# Patient Record
Sex: FEMALE | Race: WHITE | Employment: FULL TIME | URBAN - METROPOLITAN AREA
[De-identification: names, ages, dates, MRNs, and addresses within clinical notes are randomized per-mention and may not be internally consistent; named-entity substitution may affect disease eponyms.]

---

## 2019-11-13 ENCOUNTER — OP HISTORICAL/CONVERTED ENCOUNTER (OUTPATIENT)
Dept: OTHER | Age: 19
End: 2019-11-13

## 2019-11-13 LAB — CREATININE, EXTERNAL: 0.76

## 2019-11-20 ENCOUNTER — OP HISTORICAL/CONVERTED ENCOUNTER (OUTPATIENT)
Dept: OTHER | Age: 19
End: 2019-11-20

## 2020-07-22 VITALS
WEIGHT: 293 LBS | SYSTOLIC BLOOD PRESSURE: 126 MMHG | DIASTOLIC BLOOD PRESSURE: 74 MMHG | BODY MASS INDEX: 44.41 KG/M2 | HEIGHT: 68 IN

## 2020-07-22 DIAGNOSIS — H53.9 DISORDER OF VISION: ICD-10-CM

## 2020-07-22 PROBLEM — E66.01 MORBID OBESITY (HCC): Status: ACTIVE | Noted: 2020-07-22

## 2020-07-22 PROBLEM — R87.612 LOW GRADE SQUAMOUS INTRAEPITHELIAL LESION (LGSIL) ON PAPANICOLAOU SMEAR OF CERVIX: Status: ACTIVE | Noted: 2020-07-22

## 2020-07-22 PROBLEM — N92.6 IRREGULAR PERIODS: Status: ACTIVE | Noted: 2020-07-22

## 2020-07-22 PROBLEM — R10.32 LEFT LOWER QUADRANT PAIN: Status: ACTIVE | Noted: 2020-07-22

## 2020-07-22 PROBLEM — N83.209 CYST OF OVARY: Status: ACTIVE | Noted: 2020-07-22

## 2020-07-22 RX ORDER — LOPERAMIDE HYDROCHLORIDE 2 MG/1
CAPSULE ORAL
COMMUNITY
End: 2022-06-27

## 2020-07-22 RX ORDER — DICLOFENAC EPOLAMINE 0.01 G/1
1 PATCH TOPICAL EVERY 12 HOURS
COMMUNITY
End: 2022-06-27

## 2020-07-22 RX ORDER — NORETHINDRONE ACETATE AND ETHINYL ESTRADIOL .03; 1.5 MG/1; MG/1
TABLET ORAL
COMMUNITY
End: 2020-12-28

## 2020-07-22 RX ORDER — ONDANSETRON 4 MG/1
4 TABLET, ORALLY DISINTEGRATING ORAL
COMMUNITY
End: 2022-06-27

## 2021-06-07 VITALS — BODY MASS INDEX: 46.17 KG/M2 | HEIGHT: 68 IN

## 2021-06-11 ENCOUNTER — OFFICE VISIT (OUTPATIENT)
Dept: OBGYN CLINIC | Age: 21
End: 2021-06-11
Payer: COMMERCIAL

## 2021-06-11 VITALS
WEIGHT: 293 LBS | SYSTOLIC BLOOD PRESSURE: 128 MMHG | HEIGHT: 68 IN | BODY MASS INDEX: 44.41 KG/M2 | DIASTOLIC BLOOD PRESSURE: 77 MMHG

## 2021-06-11 DIAGNOSIS — Z01.419 ROUTINE GYNECOLOGICAL EXAMINATION: ICD-10-CM

## 2021-06-11 DIAGNOSIS — Z12.4 SCREENING FOR MALIGNANT NEOPLASM OF CERVIX: Primary | ICD-10-CM

## 2021-06-11 DIAGNOSIS — N94.89 SUPPRESSION OF MENSTRUATION: ICD-10-CM

## 2021-06-11 DIAGNOSIS — F32.81 PMDD (PREMENSTRUAL DYSPHORIC DISORDER): ICD-10-CM

## 2021-06-11 PROCEDURE — 99395 PREV VISIT EST AGE 18-39: CPT | Performed by: OBSTETRICS & GYNECOLOGY

## 2021-06-11 RX ORDER — NORETHINDRONE ACETATE AND ETHINYL ESTRADIOL .03; 1.5 MG/1; MG/1
1 TABLET ORAL DAILY
Qty: 3 PACKAGE | Refills: 3 | Status: SHIPPED | OUTPATIENT
Start: 2021-06-11 | End: 2022-06-27 | Stop reason: SDUPTHER

## 2021-06-11 RX ORDER — SERTRALINE HYDROCHLORIDE 25 MG/1
25 TABLET, FILM COATED ORAL DAILY
Qty: 90 TABLET | Refills: 0 | Status: SHIPPED | OUTPATIENT
Start: 2021-06-11 | End: 2022-06-27

## 2021-06-11 NOTE — PROGRESS NOTES
Chief Complaint   Patient presents with    Annual Exam     Visit Vitals  /77 (BP 1 Location: Right arm, BP Patient Position: Sitting, BP Cuff Size: Large adult)   Ht 5' 7.5\" (1.715 m)   Wt 298 lb 4 oz (135.3 kg)   LMP 06/07/2021 (Exact Date)   BMI 46.02 kg/m²

## 2021-06-11 NOTE — PROGRESS NOTES
Al Mendoza is a [de-identified] P5, 23 y.o. female   Patient's last menstrual period was 06/07/2021 (exact date). She presents for her annual    She is having no significant problems. Notes crying spells - anytime      Menstrual status:  Cycles are usually regular with a 26-32 day interval with 3-7 day duration. Flow: moderate. She does not have dysmenorrhea. Medical conditions:  Since her last annual GYN exam about one year ago, she has not the following changes in her health history: none. Mammogram History:    GUILLERMO Results (most recent):  No results found for this or any previous visit. DEXA Results (most recent):  No results found for this or any previous visit. Past Medical History:   Diagnosis Date    Morbid obesity ([address]      Past Surgical History:   Procedure Laterality Date    HX CYST REMOVAL      laparoscopic of left ovary    HX GYN      colposcopy    HX PELVIC LAPAROSCOPY         Prior to Admission medications    Medication Sig Start Date End Date Taking? Authorizing Provider   norethindrone ac-eth estradioL (Microgestin 1.5/30, 21,) 1.5-30 mg-mcg tab Take 1 Tablet by mouth daily. 6/11/21  Yes Anna Montalvo MD   sertraline (ZOLOFT) 25 mg tablet Take 1 Tablet by mouth daily. 6/11/21  Yes Anna Montalvo MD   diclofenac (FLECTOR) 1.3 % pt12 1 Patch by TransDERmal route every twelve (12) hours every twelve (12) hours. Provider, Historical   loperamide (IMODIUM) 2 mg capsule Take  by mouth. Provider, Historical   ondansetron (ZOFRAN ODT) 4 mg disintegrating tablet Take 4 mg by mouth every eight (8) hours as needed for Nausea or Vomiting. Provider, Historical       Allergies   Allergen Reactions    Latex Rash          Tobacco History:  reports that she has quit smoking. She smoked 0.25 packs per day. She has never used smokeless tobacco.  Alcohol Abuse:  reports previous alcohol use. Drug Abuse:  reports no history of drug use.     Family Medical/Cancer History: Family History   Problem Relation Age of Onset    Diabetes Paternal Grandmother           Review of Systems   Constitutional: Negative for chills, fever, malaise/fatigue and weight loss. HENT: Negative for congestion, ear pain, sinus pain and tinnitus. Eyes: Negative for blurred vision and double vision. Respiratory: Negative for cough, shortness of breath and wheezing. Cardiovascular: Negative for chest pain and palpitations. Gastrointestinal: Negative for abdominal pain, blood in stool, constipation, diarrhea, heartburn, nausea and vomiting. Genitourinary: Negative for dysuria, flank pain, frequency, hematuria and urgency. Musculoskeletal: Negative for joint pain and myalgias. Skin: Negative for itching and rash. Neurological: Negative for dizziness, weakness and headaches. Psychiatric/Behavioral: Negative for depression, memory loss and suicidal ideas. The patient is not nervous/anxious and does not have insomnia. Physical Exam  Constitutional:       Appearance: Normal appearance. HENT:      Head: Normocephalic and atraumatic. Cardiovascular:      Rate and Rhythm: Normal rate. Heart sounds: Normal heart sounds. Pulmonary:      Effort: Pulmonary effort is normal.      Breath sounds: Normal breath sounds. Chest:      Breasts:         Right: Normal.         Left: Normal.   Abdominal:      General: Abdomen is flat. Palpations: Abdomen is soft. Genitourinary:     General: Normal vulva. Vagina: Normal.      Cervix: Normal.      Uterus: Normal.       Adnexa: Right adnexa normal and left adnexa normal.      Rectum: Normal.      Comments: PAP Obtained  Neurological:      Mental Status: She is alert. Psychiatric:         Mood and Affect: Mood normal.         Behavior: Behavior normal.         Thought Content:  Thought content normal.          Visit Vitals  /77 (BP 1 Location: Right arm, BP Patient Position: Sitting, BP Cuff Size: Large adult)   Ht 5' 7.5\" (1.715 m)   Wt 298 lb 4 oz (135.3 kg)   LMP 06/07/2021 (Exact Date)   BMI 46.02 kg/m²         Assessment:   Diagnoses and all orders for this visit:    1. Screening for malignant neoplasm of cervix  -     PAP IG, RFX APTIMA HPV ASCUS (243108)    2. Routine gynecological examination  -     PAP IG, RFX APTIMA HPV ASCUS ([id number])    3. BMI 45.0-49.9, adult ([facility]    4. Suppression of menstruation    5. PMDD (premenstrual dysphoric disorder)    Other orders  -     norethindrone ac-eth estradioL (Microgestin 1.5/30, 21,) 1.5-30 mg-mcg tab; Take 1 Tablet by mouth daily. -     sertraline (ZOLOFT) 25 mg tablet; Take 1 Tablet by mouth daily. Plan: Questions addressed, will try low dose of Zoloft- pt agrees  Counseled re: diet, exercise, healthy lifestyle  Return for Annual        Follow-up and Dispositions    · Return in about 3 months (around 9/11/2021) for Follow-up.

## 2021-06-15 LAB
CYTOLOGIST CVX/VAG CYTO: NORMAL
CYTOLOGY CVX/VAG DOC CYTO: NORMAL
CYTOLOGY CVX/VAG DOC THIN PREP: NORMAL
DX ICD CODE: NORMAL
LABCORP, 190119: NORMAL
Lab: NORMAL
Lab: NORMAL
OTHER STN SPEC: NORMAL
STAT OF ADQ CVX/VAG CYTO-IMP: NORMAL

## 2021-10-27 ENCOUNTER — TELEPHONE (OUTPATIENT)
Dept: OBGYN CLINIC | Age: 21
End: 2021-10-27

## 2021-10-27 ENCOUNTER — OFFICE VISIT (OUTPATIENT)
Dept: OBGYN CLINIC | Age: 21
End: 2021-10-27
Payer: COMMERCIAL

## 2021-10-27 VITALS
DIASTOLIC BLOOD PRESSURE: 82 MMHG | HEIGHT: 68 IN | WEIGHT: 293 LBS | BODY MASS INDEX: 44.41 KG/M2 | SYSTOLIC BLOOD PRESSURE: 130 MMHG

## 2021-10-27 DIAGNOSIS — F32.81 PREMENSTRUAL DYSPHORIC DISORDER: ICD-10-CM

## 2021-10-27 DIAGNOSIS — F41.9 ANXIETY: Primary | ICD-10-CM

## 2021-10-27 PROCEDURE — 99213 OFFICE O/P EST LOW 20 MIN: CPT | Performed by: OBSTETRICS & GYNECOLOGY

## 2021-10-27 RX ORDER — SERTRALINE HYDROCHLORIDE 50 MG/1
50 TABLET, FILM COATED ORAL DAILY
Qty: 90 TABLET | Refills: 2 | Status: SHIPPED | OUTPATIENT
Start: 2021-10-27 | End: 2022-06-27

## 2021-10-27 NOTE — TELEPHONE ENCOUNTER
Returned the patient's call and she is stating Diane is telling her she doesn't have refills available on her OCPs. Advised her to have [facility] look in their files for the prescription submitted on 06/11/21.

## 2021-10-27 NOTE — PROGRESS NOTES
Lashon Wolff is a [de-identified] , 19 y.o. female   Patient's last menstrual period was 10/18/2021. She presents for her problem    She is having was on Zoloft for anxiety and depressive sxs- was much better on the medicine( lost her insurance and ran out of the medicine), has a new job with benefits now. .      Menstrual status:  Cycles are often irregular with no apparent pattern. Flow: moderate. She does not have dysmenorrhea. Medical conditions:  Since her last annual GYN exam about one year ago, she has not the following changes in her health history: none. Mammogram History:    GUILLERMO Results (most recent):  No results found for this or any previous visit. DEXA Results (most recent):  No results found for this or any previous visit. Past Medical History:   Diagnosis Date    Morbid obesity ([address]      Past Surgical History:   Procedure Laterality Date    HX CYST REMOVAL      laparoscopic of left ovary    HX GYN      colposcopy    HX PELVIC LAPAROSCOPY         Prior to Admission medications    Medication Sig Start Date End Date Taking? Authorizing Provider   sertraline (ZOLOFT) 50 mg tablet Take 1 Tablet by mouth daily. 10/27/21  Yes Jennifer Mckay MD   norethindrone ac-eth estradioL (Microgestin 1.5/30, 21,) 1.5-30 mg-mcg tab Take 1 Tablet by mouth daily. 6/11/21   Jennifer Mckay MD   sertraline (ZOLOFT) 25 mg tablet Take 1 Tablet by mouth daily. 6/11/21   Jennifer Mckay MD   diclofenac (FLECTOR) 1.3 % pt12 1 Patch by TransDERmal route every twelve (12) hours every twelve (12) hours. Provider, Historical   loperamide (IMODIUM) 2 mg capsule Take  by mouth. Provider, Historical   ondansetron (ZOFRAN ODT) 4 mg disintegrating tablet Take 4 mg by mouth every eight (8) hours as needed for Nausea or Vomiting. Provider, Historical       Allergies   Allergen Reactions    Latex Rash          Tobacco History:  reports that she has quit smoking. She smoked 0.25 packs per day.  She has never used smokeless tobacco.  Alcohol Abuse:  reports previous alcohol use. Drug Abuse:  reports no history of drug use. Family Medical/Cancer History:   Family History   Problem Relation Age of Onset    Diabetes Paternal Grandmother           Review of Systems   Constitutional: Negative for chills, fever, malaise/fatigue and weight loss. HENT: Negative for congestion, ear pain, sinus pain and tinnitus. Eyes: Negative for blurred vision and double vision. Respiratory: Negative for cough, shortness of breath and wheezing. Cardiovascular: Negative for chest pain and palpitations. Gastrointestinal: Negative for abdominal pain, blood in stool, constipation, diarrhea, heartburn, nausea and vomiting. Genitourinary: Negative for dysuria, flank pain, frequency, hematuria and urgency. Musculoskeletal: Negative for joint pain and myalgias. Skin: Negative for itching and rash. Neurological: Negative for dizziness, weakness and headaches. Psychiatric/Behavioral: Negative for depression, memory loss and suicidal ideas. The patient is not nervous/anxious and does not have insomnia. Physical Exam  Constitutional:       Appearance: Normal appearance. HENT:      Head: Normocephalic and atraumatic. Pulmonary:      Effort: Pulmonary effort is normal.   Neurological:      Mental Status: She is alert. Psychiatric:         Mood and Affect: Mood normal.         Behavior: Behavior normal.         Thought Content: Thought content normal.          Visit Vitals  /82   Ht 5' 7.5\" (1.715 m)   Wt 305 lb 12.8 oz (138.7 kg)   LMP 10/18/2021   BMI 47.19 kg/m²         Assessment:Diagnoses and all orders for this visit:    1. Anxiety    2. Premenstrual dysphoric disorder    Other orders  -     sertraline (ZOLOFT) 50 mg tablet; Take 1 Tablet by mouth daily.         Plan: Questions addressed  Counseled re: diet, exercise, healthy lifestyle  Return for Annual

## 2021-11-04 ENCOUNTER — TELEPHONE (OUTPATIENT)
Dept: OBGYN CLINIC | Age: 21
End: 2021-11-04

## 2021-11-04 NOTE — TELEPHONE ENCOUNTER
Returned the patient's call and she states she had her regular cycle and that ended almost 3 weeks ago. She states she then started with a brown colored discharge and cramping with some small clots. States she has been bleeding for the last 10 days. She currently takes Microgestin 1.5/30. Questioned how much she is bleeding and she states a super plus tampon lasts her all night. Per Dr Fuentes Cardenas, patient is to continue the pack she is currently taking, have her cycle and then restart a new pack.

## 2022-03-18 PROBLEM — E66.01 MORBID OBESITY (HCC): Status: ACTIVE | Noted: 2020-07-22

## 2022-03-18 PROBLEM — R10.32 LEFT LOWER QUADRANT PAIN: Status: ACTIVE | Noted: 2020-07-22

## 2022-03-18 PROBLEM — R87.612 LOW GRADE SQUAMOUS INTRAEPITHELIAL LESION (LGSIL) ON PAPANICOLAOU SMEAR OF CERVIX: Status: ACTIVE | Noted: 2020-07-22

## 2022-03-19 PROBLEM — H53.9 DISORDER OF VISION: Status: ACTIVE | Noted: 2020-07-22

## 2022-03-19 PROBLEM — N92.6 IRREGULAR PERIODS: Status: ACTIVE | Noted: 2020-07-22

## 2022-03-20 PROBLEM — N83.209 CYST OF OVARY: Status: ACTIVE | Noted: 2020-07-22

## 2022-06-27 ENCOUNTER — OFFICE VISIT (OUTPATIENT)
Dept: OBGYN CLINIC | Age: 22
End: 2022-06-27
Payer: COMMERCIAL

## 2022-06-27 VITALS
SYSTOLIC BLOOD PRESSURE: 124 MMHG | WEIGHT: 293 LBS | DIASTOLIC BLOOD PRESSURE: 72 MMHG | BODY MASS INDEX: 44.41 KG/M2 | HEIGHT: 68 IN

## 2022-06-27 DIAGNOSIS — Z12.4 SCREENING FOR MALIGNANT NEOPLASM OF CERVIX: Primary | ICD-10-CM

## 2022-06-27 DIAGNOSIS — Z01.419 ROUTINE GYNECOLOGICAL EXAMINATION: ICD-10-CM

## 2022-06-27 DIAGNOSIS — N94.89 SUPPRESSION OF MENSTRUATION: ICD-10-CM

## 2022-06-27 PROCEDURE — 99395 PREV VISIT EST AGE 18-39: CPT | Performed by: OBSTETRICS & GYNECOLOGY

## 2022-06-27 RX ORDER — NORETHINDRONE ACETATE AND ETHINYL ESTRADIOL .03; 1.5 MG/1; MG/1
1 TABLET ORAL DAILY
Qty: 3 DOSE PACK | Refills: 3 | Status: SHIPPED | OUTPATIENT
Start: 2022-06-27

## 2022-06-27 NOTE — PROGRESS NOTES
Chief Complaint   Patient presents with    Annual Exam     Visit Vitals  /72 (BP 1 Location: Left upper arm, BP Patient Position: Sitting, BP Cuff Size: Large adult)   Ht 5' 7.5\" (1.715 m)   Wt 315 lb 4 oz (143 kg)   LMP 06/20/2022 (Approximate)   BMI 48.65 kg/m²

## 2022-06-27 NOTE — PROGRESS NOTES
Duane Odonnell is a [de-identified] P5, 19 y.o. female   Patient's last menstrual period was 06/20/2022 (approximate). She presents for her annual    She is having no significant problems. Menstrual status:  Cycles are very light to nonexistent due to contraceptive hormones. Flow: light. She does not have dysmenorrhea. Medical conditions:  Since her last annual GYN exam about one year ago, she has not the following changes in her health history: none. Mammogram History:    GUILLERMO Results (most recent):  No results found for this or any previous visit. DEXA Results (most recent):  No results found for this or any previous visit. Past Medical History:   Diagnosis Date    Mor[facility] [address]      Past Surgical History:   Procedure Laterality Date    HX CYST REMOVAL      laparoscopic of left ovary    HX GYN      colposcopy    HX PELVIC LAPAROSCOPY         Prior to Admission medications    Medication Sig Start Date End Date Taking? Authorizing Provider   norethindrone ac-eth estradioL (Microgestin 1.5/30, 21,) 1.5-30 mg-mcg tab Take 1 Tablet by mouth daily. 6/27/22  Yes Hola Jesus MD       Allergies   Allergen Reactions    Latex Rash          Tobacco History:  reports that she has quit smoking. She smoked 0.25 packs per day. She has never used smokeless tobacco.  Alcohol use:  reports previous alcohol use. Drug use:  reports no history of drug use. Family Medical/Cancer History:   Family History   Problem Relation Age of Onset    Diabetes Paternal Grandmother           Review of Systems   Constitutional: Negative for chills, fever, malaise/fatigue and weight loss. HENT: Negative for congestion, ear pain, sinus pain and tinnitus. Eyes: Negative for blurred vision and double vision. Respiratory: Negative for cough, shortness of breath and wheezing. Cardiovascular: Negative for chest pain and palpitations.    Gastrointestinal: Negative for abdominal pain, blood in stool, constipation, diarrhea, heartburn, nausea and vomiting. Genitourinary: Negative for dysuria, flank pain, frequency, hematuria and urgency. Musculoskeletal: Negative for joint pain and myalgias. Skin: Negative for itching and rash. Neurological: Negative for dizziness, weakness and headaches. Psychiatric/Behavioral: Negative for depression, memory loss and suicidal ideas. The patient is not nervous/anxious and does not have insomnia. Physical Exam  Constitutional:       Appearance: Normal appearance. HENT:      Head: Normocephalic and atraumatic. Cardiovascular:      Rate and Rhythm: Normal rate. Heart sounds: Normal heart sounds. Pulmonary:      Effort: Pulmonary effort is normal.      Breath sounds: Normal breath sounds. Chest:   Breasts:      Right: Normal.      Left: Normal.       Abdominal:      General: Abdomen is flat. Palpations: Abdomen is soft. Genitourinary:     General: Normal vulva. Vagina: Normal.      Cervix: Normal.      Uterus: Normal.       Adnexa: Right adnexa normal and left adnexa normal.      Rectum: Normal.      Comments: PAP Obtained  Neurological:      Mental Status: She is alert. Psychiatric:         Mood and Affect: Mood normal.         Behavior: Behavior normal.         Thought Content: Thought content normal.          Visit Vitals  /72 (BP 1 Location: Left upper arm, BP Patient Position: Sitting, BP Cuff Size: Large adult)   Ht 5' 7.5\" (1.715 m)   Wt 315 lb 4 oz (143 kg)   LMP 06/20/2022 (Approximate)   BMI 48.65 kg/m²         Assessment:   Diagnoses and all orders for this visit:    1. Screening for malignant neoplasm of cervix  -     PAP IG, RFX APTIMA HPV ASCUS (994124)    2. Routine gynecological examination  -     PAP IG, RFX APTIMA HPV ASCUS ([id number])    3. Suppression of menstruation    Other orders  -     norethindrone ac-eth estradioL (Microgestin 1.5/30, 21,) 1.5-30 mg-mcg tab; Take 1 Tablet by mouth daily.         Plan: Questions addressed  Counseled re: diet, exercise, healthy lifestyle  Return for Annual          Follow-up and Dispositions    · Return for 1 yr annual.

## 2022-06-29 LAB
CYTOLOGIST CVX/VAG CYTO: NORMAL
CYTOLOGY CVX/VAG DOC CYTO: NORMAL
CYTOLOGY CVX/VAG DOC THIN PREP: NORMAL
DX ICD CODE: NORMAL
LABCORP, 190119: NORMAL
Lab: NORMAL
OTHER STN SPEC: NORMAL
STAT OF ADQ CVX/VAG CYTO-IMP: NORMAL

## 2023-05-22 RX ORDER — NORETHINDRONE ACETATE AND ETHINYL ESTRADIOL .03; 1.5 MG/1; MG/1
1 TABLET ORAL DAILY
COMMUNITY
Start: 2022-06-27

## 2023-08-02 RX ORDER — NORETHINDRONE ACETATE AND ETHINYL ESTRADIOL 1.5; 3 MG/1; UG/1
TABLET ORAL
Qty: 84 TABLET | Refills: 0 | Status: SHIPPED | OUTPATIENT
Start: 2023-08-02 | End: 2023-09-07 | Stop reason: SDUPTHER

## 2023-09-07 ENCOUNTER — OFFICE VISIT (OUTPATIENT)
Age: 23
End: 2023-09-07

## 2023-09-07 VITALS
SYSTOLIC BLOOD PRESSURE: 142 MMHG | TEMPERATURE: 97.3 F | HEART RATE: 88 BPM | HEIGHT: 67 IN | DIASTOLIC BLOOD PRESSURE: 82 MMHG | RESPIRATION RATE: 18 BRPM | BODY MASS INDEX: 45.99 KG/M2 | OXYGEN SATURATION: 98 % | WEIGHT: 293 LBS

## 2023-09-07 DIAGNOSIS — N94.89 OTHER SPECIFIED CONDITIONS ASSOCIATED WITH FEMALE GENITAL ORGANS AND MENSTRUAL CYCLE: Primary | ICD-10-CM

## 2023-09-07 DIAGNOSIS — N94.89 SUPPRESSION OF MENSTRUATION: ICD-10-CM

## 2023-09-07 DIAGNOSIS — Z12.4 ENCOUNTER FOR SCREENING FOR CERVICAL CANCER: ICD-10-CM

## 2023-09-07 DIAGNOSIS — Z01.419 GYNECOLOGIC EXAM NORMAL: ICD-10-CM

## 2023-09-07 RX ORDER — PHENTERMINE HYDROCHLORIDE 15 MG/1
15 CAPSULE ORAL EVERY MORNING
Qty: 90 CAPSULE | Refills: 0 | Status: SHIPPED | OUTPATIENT
Start: 2023-09-07 | End: 2023-12-06

## 2023-09-07 RX ORDER — ERGOCALCIFEROL 1.25 MG/1
CAPSULE ORAL
COMMUNITY
Start: 2023-08-02

## 2023-09-07 RX ORDER — NORETHINDRONE ACETATE AND ETHINYL ESTRADIOL .03; 1.5 MG/1; MG/1
1 TABLET ORAL DAILY
Qty: 84 TABLET | Refills: 3 | Status: SHIPPED | OUTPATIENT
Start: 2023-09-07 | End: 2023-09-07 | Stop reason: SDUPTHER

## 2023-09-07 RX ORDER — ERGOCALCIFEROL 1.25 MG/1
1 CAPSULE ORAL WEEKLY
COMMUNITY
Start: 2022-12-04

## 2023-09-07 RX ORDER — TOPIRAMATE 25 MG/1
25 TABLET ORAL DAILY
Qty: 90 TABLET | Refills: 0 | Status: SHIPPED | OUTPATIENT
Start: 2023-09-07

## 2023-09-07 RX ORDER — NORETHINDRONE ACETATE AND ETHINYL ESTRADIOL .03; 1.5 MG/1; MG/1
1 TABLET ORAL DAILY
Qty: 84 TABLET | Refills: 3 | Status: SHIPPED | OUTPATIENT
Start: 2023-09-07

## 2023-09-07 SDOH — ECONOMIC STABILITY: HOUSING INSECURITY
IN THE LAST 12 MONTHS, WAS THERE A TIME WHEN YOU DID NOT HAVE A STEADY PLACE TO SLEEP OR SLEPT IN A SHELTER (INCLUDING NOW)?: NO

## 2023-09-07 SDOH — ECONOMIC STABILITY: FOOD INSECURITY: WITHIN THE PAST 12 MONTHS, YOU WORRIED THAT YOUR FOOD WOULD RUN OUT BEFORE YOU GOT MONEY TO BUY MORE.: NEVER TRUE

## 2023-09-07 SDOH — ECONOMIC STABILITY: INCOME INSECURITY: HOW HARD IS IT FOR YOU TO PAY FOR THE VERY BASICS LIKE FOOD, HOUSING, MEDICAL CARE, AND HEATING?: NOT HARD AT ALL

## 2023-09-07 SDOH — ECONOMIC STABILITY: FOOD INSECURITY: WITHIN THE PAST 12 MONTHS, THE FOOD YOU BOUGHT JUST DIDN'T LAST AND YOU DIDN'T HAVE MONEY TO GET MORE.: NEVER TRUE

## 2023-09-07 ASSESSMENT — PATIENT HEALTH QUESTIONNAIRE - PHQ9
SUM OF ALL RESPONSES TO PHQ QUESTIONS 1-9: 0
SUM OF ALL RESPONSES TO PHQ QUESTIONS 1-9: 0
SUM OF ALL RESPONSES TO PHQ9 QUESTIONS 1 & 2: 0
2. FEELING DOWN, DEPRESSED OR HOPELESS: 0
1. LITTLE INTEREST OR PLEASURE IN DOING THINGS: 0
SUM OF ALL RESPONSES TO PHQ QUESTIONS 1-9: 0
SUM OF ALL RESPONSES TO PHQ QUESTIONS 1-9: 0

## 2023-09-07 ASSESSMENT — ENCOUNTER SYMPTOMS
GASTROINTESTINAL NEGATIVE: 1
RESPIRATORY NEGATIVE: 1

## 2023-09-07 NOTE — PROGRESS NOTES
Kobe Farrar is a , 20 y.o. female   Patient's last menstrual period was 2023 (approximate). She presents for her annual    She is having no significant problems. Menstrual status:  Cycles are very light to non existent due to contraceptive hormones. Flow: light. She does not have dysmenorrhea. Medical conditions:  Since her last annual GYN exam about one year ago, she has not the following changes in her health history: none. Mammogram History:    SEA Results (most recent):  @[facility](FFZ2190:1)@     DEXA Results (most recent[facility]  [facility][id number][facility]@       Past Medical History:   Diagnosis Date    Morbid obesity ([address])      Past Surgical History:   Procedure Laterality Date    CYST REMOVAL      laparoscopic of left ovary    GYN      colposcopy    PELVIC LAPAROSCOPY         Prior to Admission medications    Medication Sig Start Date End Date Taking? Authorizing Provider   vitamin D (ERGOCALCIFEROL) 1.25 MG (67750 UT) CAPS capsule TAKE 1 CAPSULE BY MOUTH ONCE A WEEK AS DIRECTED 23  Yes Historical Provider, MD   ergocalciferol (ERGOCALCIFEROL) 1.25 MG (70950 UT) capsule Take 1 capsule by mouth once a week 22  Yes Historical Provider, MD   Norethindrone Acet-Ethinyl Est (MICROGESTIN) 1.5-30 MG-MCG TABS Take 1 tablet by mouth daily 23  Yes Emily Emery MD       Allergies   Allergen Reactions    Latex Rash          Tobacco History:  reports that she has never smoked. She has been exposed to tobacco smoke. She has never used smokeless tobacco.  Alcohol use:  reports that she does not currently use alcohol. Drug use:  reports no history of drug use. Family Medical/Cancer History:   Family History   Problem Relation Age of Onset    Diabetes Paternal Grandmother     Diabetes Father         Review of Systems   Constitutional: Negative. Respiratory: Negative. Cardiovascular: Negative. Gastrointestinal: Negative. Genitourinary: Negative.

## 2023-09-09 LAB
., LABCORP: NORMAL
CYTOLOGIST CVX/VAG CYTO: NORMAL
CYTOLOGY CVX/VAG DOC CYTO: NORMAL
CYTOLOGY CVX/VAG DOC THIN PREP: NORMAL
DX ICD CODE: NORMAL
Lab: NORMAL
OTHER STN SPEC: NORMAL
STAT OF ADQ CVX/VAG CYTO-IMP: NORMAL

## 2023-09-12 ENCOUNTER — TELEPHONE (OUTPATIENT)
Age: 23
End: 2023-09-12

## 2023-09-12 NOTE — TELEPHONE ENCOUNTER
Called and spoke to patient. I received a request from Dr. Lolly Hope to schedule a 2 month follow up appointment with him. Appointment was scheduled for 11/07 in the [facility]) office and patient will follow up if she is unable to make this appointment due to her job.

## 2023-11-07 ENCOUNTER — TELEPHONE (OUTPATIENT)
Age: 23
End: 2023-11-07

## 2023-11-07 NOTE — TELEPHONE ENCOUNTER
11/07/23 4:08PM Rec'd a call from the patient informing she needs to r/s her appt from 11/07/23 w/Dr. Tolbert's for her 3:15PM appt at ---patient is rescheduled on 12/07/23 at 2:45PM here at the [facility] location with Dr. Brian Stacy.

## 2023-12-07 ENCOUNTER — OFFICE VISIT (OUTPATIENT)
Age: 23
End: 2023-12-07

## 2023-12-07 VITALS
DIASTOLIC BLOOD PRESSURE: 74 MMHG | SYSTOLIC BLOOD PRESSURE: 124 MMHG | WEIGHT: 292.13 LBS | BODY MASS INDEX: 45.85 KG/M2 | HEIGHT: 67 IN

## 2023-12-07 RX ORDER — PHENTERMINE HYDROCHLORIDE 30 MG/1
30 CAPSULE ORAL EVERY MORNING
Qty: 90 CAPSULE | Refills: 0 | Status: SHIPPED | OUTPATIENT
Start: 2023-12-07 | End: 2024-03-06

## 2023-12-07 RX ORDER — TOPIRAMATE 25 MG/1
25 TABLET ORAL DAILY
Qty: 90 TABLET | Refills: 0 | Status: SHIPPED | OUTPATIENT
Start: 2023-12-07

## 2023-12-07 ASSESSMENT — ENCOUNTER SYMPTOMS
RESPIRATORY NEGATIVE: 1
GASTROINTESTINAL NEGATIVE: 1

## 2023-12-07 NOTE — PROGRESS NOTES
Frederick Leyva is a , 20 y.o. female   Patient's last menstrual period was 2023 (approximate). She presents for her problem    She is having no significant problems. Doing well on meds- has lost # 30, no side effects from medicine      Menstrual status:  Cycles are usually regular with a 26-32 day interval with 3-7 day duration. Flow: moderate. She does not have dysmenorrhea. Medical conditions:  Since her last annual GYN exam about one year ago, she has not the following changes in her health history: none. Mammogram History:    SEA Results (most recent):  @[employer](TOA2500:1)@     DEXA Results (most recent):  @[facility](LPC3509:1)@       Past Medical History:   Diagnosis Date    Morbid obesity ([address])      Past Surgical History:   Procedure Laterality Date    CYST REMOVAL      laparoscopic of left ovary    GYN      colposcopy    PELVIC LAPAROSCOPY         Prior to Admission medications    Medication Sig Start Date End Date Taking? Authorizing Provider   phentermine 30 MG capsule Take 1 capsule by mouth every morning for 90 doses. Max Daily Amount: 30 mg 12/7/23 3/6/24 Yes Mary Beth Guzman MD   topiramate (TOPAMAX) 25 MG tablet Take 1 tablet by mouth daily 23  Yes Mary Beth Guzman MD   vitamin D (ERGOCALCIFEROL) 1.25 MG (33996 UT) CAPS capsule TAKE 1 CAPSULE BY MOUTH ONCE A WEEK AS DIRECTED 23  Yes Mauricio Agarwal MD   ergocalciferol (ERGOCALCIFEROL) 1.25 MG (91294 UT) capsule Take 1 capsule by mouth once a week 22  Yes Mauricio Agarwal MD   Norethindrone Acet-Ethinyl Est (MICROGESTIN) 1.5-30 MG-MCG TABS Take 1 tablet by mouth daily 23  Yes Mary Beth Guzman MD       Allergies   Allergen Reactions    Latex Rash          Tobacco History:  reports that she has never smoked. She has been exposed to tobacco smoke. She has never used smokeless tobacco.  Alcohol use:  reports that she does not currently use alcohol.   Drug use:  reports no history of

## 2024-11-29 DIAGNOSIS — N94.89 OTHER SPECIFIED CONDITIONS ASSOCIATED WITH FEMALE GENITAL ORGANS AND MENSTRUAL CYCLE: ICD-10-CM

## 2024-11-29 RX ORDER — NORETHINDRONE ACETATE AND ETHINYL ESTRADIOL .03; 1.5 MG/1; MG/1
1 TABLET ORAL DAILY
Qty: 84 TABLET | Refills: 1 | Status: SHIPPED | OUTPATIENT
Start: 2024-11-29

## 2025-01-16 ENCOUNTER — OFFICE VISIT (OUTPATIENT)
Age: 25
End: 2025-01-16
Payer: COMMERCIAL

## 2025-01-16 VITALS
HEIGHT: 67 IN | DIASTOLIC BLOOD PRESSURE: 76 MMHG | BODY MASS INDEX: 45.99 KG/M2 | WEIGHT: 293 LBS | SYSTOLIC BLOOD PRESSURE: 128 MMHG

## 2025-01-16 DIAGNOSIS — N94.89 SUPPRESSION OF MENSTRUATION: ICD-10-CM

## 2025-01-16 DIAGNOSIS — Z01.419 GYNECOLOGIC EXAM NORMAL: ICD-10-CM

## 2025-01-16 DIAGNOSIS — Z12.4 PAP SMEAR FOR CERVICAL CANCER SCREENING: Primary | ICD-10-CM

## 2025-01-16 DIAGNOSIS — N60.82 SEBACEOUS CYST OF SKIN OF LEFT BREAST: ICD-10-CM

## 2025-01-16 DIAGNOSIS — N94.89 OTHER SPECIFIED CONDITIONS ASSOCIATED WITH FEMALE GENITAL ORGANS AND MENSTRUAL CYCLE: ICD-10-CM

## 2025-01-16 PROCEDURE — 99395 PREV VISIT EST AGE 18-39: CPT | Performed by: OBSTETRICS & GYNECOLOGY

## 2025-01-16 RX ORDER — NORETHINDRONE ACETATE AND ETHINYL ESTRADIOL .03; 1.5 MG/1; MG/1
1 TABLET ORAL DAILY
Qty: 84 TABLET | Refills: 3 | Status: SHIPPED | OUTPATIENT
Start: 2025-01-16

## 2025-01-16 ASSESSMENT — ENCOUNTER SYMPTOMS
GASTROINTESTINAL NEGATIVE: 1
RESPIRATORY NEGATIVE: 1

## 2025-01-16 NOTE — PROGRESS NOTES
Chief Complaint   Patient presents with    Annual Exam     /76 (Site: Left Upper Arm, Position: Sitting, Cuff Size: Large Adult)   Ht 1.702 m (5' 7\")   Wt (!) 141.1 kg (311 lb)   LMP 12/18/2024 (Approximate)   BMI 48.71 kg/m²

## 2025-01-16 NOTE — PROGRESS NOTES
Juana Aguirre is a , 24 y.o. female   Patient's last menstrual period was 2024 (approximate).    She presents for her annual    She is having  a small knot in the left breast- has been present for several months, has had a course of abxs for other reason- no change .      Menstrual status:  Cycles are very light to non existent due to contraceptive hormones.    Flow: light.      She does not have dysmenorrhea.      Medical conditions:  Since her last annual GYN exam about one year ago, she has not the following changes in her health history: none.     Mammogram History:    SEA Results (most recent):  @[facility]([id number]:1)@     DEXA Results (most recent):  @[employer](PDV3062:1)@       Past Medical History:   Diagnosis Date    Morbid obesity      Past Surgical History:   Procedure Laterality Date    COLPOSCOPY      CYST REMOVAL      laparoscopic of left ovary    PELVIC LAPAROSCOPY         Prior to Admission medications    Medication Sig Start Date End Date Taking? Authorizing Provider   Norethindrone Acet-Ethinyl Est (MICROGESTIN) 1.5-30 MG-MCG TABS Take 1 tablet by mouth daily 25  Yes Daniel Menjivar MD   vitamin D (ERGOCALCIFEROL) 1.25 MG (81665 UT) CAPS capsule TAKE 1 CAPSULE BY MOUTH ONCE A WEEK AS DIRECTED 23  Yes Mauricio Agarwal MD   ergocalciferol (ERGOCALCIFEROL) 1.25 MG (28320 UT) capsule Take 1 capsule by mouth once a week 22  Yes Mauricio Agarwal MD       Allergies   Allergen Reactions    Latex Rash          Tobacco History:  reports that she has never smoked. She has been exposed to tobacco smoke. She has never used smokeless tobacco.  Alcohol use:  reports that she does not currently use alcohol.  Drug use:  reports no history of drug use.    Family Medical/Cancer History:   Family History   Problem Relation Age of Onset    Diabetes Paternal Grandmother     Diabetes Father         Review of Systems   Constitutional: Negative.    Respiratory: Negative.

## 2025-01-22 LAB
., LABCORP: NORMAL
CYTOLOGIST CVX/VAG CYTO: NORMAL
CYTOLOGY CVX/VAG DOC CYTO: NORMAL
CYTOLOGY CVX/VAG DOC THIN PREP: NORMAL
DX ICD CODE: NORMAL
Lab: NORMAL
OTHER STN SPEC: NORMAL
PATHOLOGIST CVX/VAG CYTO: NORMAL
RECOM F/U CVX/VAG CYTO: NORMAL
STAT OF ADQ CVX/VAG CYTO-IMP: NORMAL

## 2025-05-27 ENCOUNTER — OFFICE VISIT (OUTPATIENT)
Facility: CLINIC | Age: 25
End: 2025-05-27
Payer: COMMERCIAL

## 2025-05-27 VITALS
BODY MASS INDEX: 45.04 KG/M2 | TEMPERATURE: 97.9 F | SYSTOLIC BLOOD PRESSURE: 123 MMHG | RESPIRATION RATE: 20 BRPM | OXYGEN SATURATION: 100 % | HEIGHT: 67 IN | DIASTOLIC BLOOD PRESSURE: 83 MMHG | HEART RATE: 62 BPM | WEIGHT: 287 LBS

## 2025-05-27 DIAGNOSIS — Z11.4 ENCOUNTER FOR SCREENING FOR HIV: ICD-10-CM

## 2025-05-27 DIAGNOSIS — N92.6 IRREGULAR PERIODS: Primary | ICD-10-CM

## 2025-05-27 DIAGNOSIS — Z11.59 ENCOUNTER FOR HEPATITIS C SCREENING TEST FOR LOW RISK PATIENT: ICD-10-CM

## 2025-05-27 DIAGNOSIS — Z76.89 ESTABLISHING CARE WITH NEW DOCTOR, ENCOUNTER FOR: ICD-10-CM

## 2025-05-27 DIAGNOSIS — E66.01 MORBID OBESITY (HCC): ICD-10-CM

## 2025-05-27 PROCEDURE — 99204 OFFICE O/P NEW MOD 45 MIN: CPT

## 2025-05-27 SDOH — ECONOMIC STABILITY: FOOD INSECURITY: WITHIN THE PAST 12 MONTHS, YOU WORRIED THAT YOUR FOOD WOULD RUN OUT BEFORE YOU GOT MONEY TO BUY MORE.: NEVER TRUE

## 2025-05-27 SDOH — ECONOMIC STABILITY: FOOD INSECURITY: WITHIN THE PAST 12 MONTHS, THE FOOD YOU BOUGHT JUST DIDN'T LAST AND YOU DIDN'T HAVE MONEY TO GET MORE.: NEVER TRUE

## 2025-05-27 ASSESSMENT — PATIENT HEALTH QUESTIONNAIRE - PHQ9
3. TROUBLE FALLING OR STAYING ASLEEP: NOT AT ALL
SUM OF ALL RESPONSES TO PHQ QUESTIONS 1-9: 2
5. POOR APPETITE OR OVEREATING: NOT AT ALL
9. THOUGHTS THAT YOU WOULD BE BETTER OFF DEAD, OR OF HURTING YOURSELF: NOT AT ALL
2. FEELING DOWN, DEPRESSED OR HOPELESS: NOT AT ALL
SUM OF ALL RESPONSES TO PHQ QUESTIONS 1-9: 2
4. FEELING TIRED OR HAVING LITTLE ENERGY: NOT AT ALL
7. TROUBLE CONCENTRATING ON THINGS, SUCH AS READING THE NEWSPAPER OR WATCHING TELEVISION: MORE THAN HALF THE DAYS
8. MOVING OR SPEAKING SO SLOWLY THAT OTHER PEOPLE COULD HAVE NOTICED. OR THE OPPOSITE, BEING SO FIGETY OR RESTLESS THAT YOU HAVE BEEN MOVING AROUND A LOT MORE THAN USUAL: NOT AT ALL
1. LITTLE INTEREST OR PLEASURE IN DOING THINGS: NOT AT ALL
10. IF YOU CHECKED OFF ANY PROBLEMS, HOW DIFFICULT HAVE THESE PROBLEMS MADE IT FOR YOU TO DO YOUR WORK, TAKE CARE OF THINGS AT HOME, OR GET ALONG WITH OTHER PEOPLE: NOT DIFFICULT AT ALL
6. FEELING BAD ABOUT YOURSELF - OR THAT YOU ARE A FAILURE OR HAVE LET YOURSELF OR YOUR FAMILY DOWN: NOT AT ALL
SUM OF ALL RESPONSES TO PHQ QUESTIONS 1-9: 2
SUM OF ALL RESPONSES TO PHQ QUESTIONS 1-9: 2

## 2025-05-27 NOTE — PROGRESS NOTES
[address] [address]  Phone: [phone]  Fax: [phone]      Chief Complaint:     Chief Complaint   Patient presents with    New Patient       Subjective:   HPI:  Juana Aguirre is a 24 y.o. female that presents for the above complaint.    Establish care  - started natural weight loss journey (started at 330 lbs)  - previously on OCPs and had regular periods with it, not interested in being back on it as it makes her feel a little bit more emotional   - now periods are very light and very irregular/sporadic, LMP last month   - reports previous dx of ADHD as a child  - last pap 2025 which was nml        Review of Systems   All other systems reviewed and are negative.    Current Medications  No current outpatient medications on file.     No current facility-administered medications for this visit.       Allergies  Allergies   Allergen Reactions    Latex Rash       Past Medical History  Past Medical History:   Diagnosis Date    Morbid obesity (HCC)        Past Surgical History   Past Surgical History:   Procedure Laterality Date    COLPOSCOPY      CYST REMOVAL      laparoscopic of left ovary    PELVIC LAPAROSCOPY         Family History  Family History   Problem Relation Age of Onset    Diabetes Paternal Grandmother     Diabetes Father        Social History  Social History     Socioeconomic History    Marital status: Single     Spouse name: Not on file    Number of children: Not on file    Years of education: Not on file    Highest education level: Not on file   Occupational History    Not on file   Tobacco Use    Smoking status: Former     Current packs/day: 0.00     Average packs/day: 0.5 packs/day for 6.0 years (3.0 ttl pk-yrs)     Types: Cigarettes     Start date: 2018     Quit date: 2024     Years since quittin.4     Passive exposure: Yes    Smokeless tobacco: Never   Vaping Use    Vaping status: Every Day    Substances: Flavoring

## 2025-05-27 NOTE — PROGRESS NOTES
\"Have you been to the ER, urgent care clinic since your last visit?  Hospitalized since your last visit?\"    no    “Have you seen or consulted any other health care providers outside our system since your last visit?”    no              Goals that were addressed and/or need to be completed during or after this appointment include   Health Maintenance Due   Topic Date Due    Varicella vaccine (2 of 2 - 2-dose childhood series) 07/01/2002    HPV vaccine (1 - 3-dose series) Never done    HIV screen  Never done    Hepatitis C screen  Never done    DTaP/Tdap/Td vaccine (7 - Td or Tdap) 08/13/2020    COVID-19 Vaccine (4 - 2024-25 season) 09/01/2024

## 2025-05-28 LAB
25(OH)D3 SERPL-MCNC: 25.3 NG/ML (ref 30–100)
ALBUMIN SERPL-MCNC: 4 G/DL (ref 3.5–5)
ALBUMIN/GLOB SERPL: 1.1 (ref 1.1–2.2)
ALP SERPL-CCNC: 73 U/L (ref 45–117)
ALT SERPL-CCNC: 33 U/L (ref 12–78)
ANION GAP SERPL CALC-SCNC: 8 MMOL/L (ref 2–12)
AST SERPL-CCNC: 22 U/L (ref 15–37)
BILIRUB SERPL-MCNC: 0.4 MG/DL (ref 0.2–1)
BUN SERPL-MCNC: 20 MG/DL (ref 6–20)
BUN/CREAT SERPL: 27 (ref 12–20)
CALCIUM SERPL-MCNC: 9.4 MG/DL (ref 8.5–10.1)
CHLORIDE SERPL-SCNC: 108 MMOL/L (ref 97–108)
CHOLEST SERPL-MCNC: 142 MG/DL
CO2 SERPL-SCNC: 24 MMOL/L (ref 21–32)
CREAT SERPL-MCNC: 0.73 MG/DL (ref 0.55–1.02)
EST. AVERAGE GLUCOSE BLD GHB EST-MCNC: 91 MG/DL
GLOBULIN SER CALC-MCNC: 3.5 G/DL (ref 2–4)
GLUCOSE SERPL-MCNC: 87 MG/DL (ref 65–100)
HBA1C MFR BLD: 4.8 % (ref 4–5.6)
HCV AB SER IA-ACNC: 0.09 INDEX
HCV AB SERPL QL IA: NONREACTIVE
HDLC SERPL-MCNC: 38 MG/DL
HDLC SERPL: 3.7 (ref 0–5)
HIV 1+2 AB+HIV1 P24 AG SERPL QL IA: NONREACTIVE
HIV 1/2 RESULT COMMENT: NORMAL
LDLC SERPL CALC-MCNC: 79.4 MG/DL (ref 0–100)
POTASSIUM SERPL-SCNC: 4.2 MMOL/L (ref 3.5–5.1)
PROT SERPL-MCNC: 7.5 G/DL (ref 6.4–8.2)
SODIUM SERPL-SCNC: 140 MMOL/L (ref 136–145)
TRIGL SERPL-MCNC: 123 MG/DL
VLDLC SERPL CALC-MCNC: 24.6 MG/DL

## 2025-05-30 LAB — 17OHP SERPL-MCNC: 20 NG/DL

## 2025-06-02 ENCOUNTER — RESULTS FOLLOW-UP (OUTPATIENT)
Facility: CLINIC | Age: 25
End: 2025-06-02

## 2025-06-05 LAB — TESTOST FREE SERPL-MCNC: 1.8 PG/ML (ref 0–4.2)
